# Patient Record
Sex: MALE | Race: WHITE | NOT HISPANIC OR LATINO | URBAN - NONMETROPOLITAN AREA
[De-identification: names, ages, dates, MRNs, and addresses within clinical notes are randomized per-mention and may not be internally consistent; named-entity substitution may affect disease eponyms.]

---

## 2020-02-24 ENCOUNTER — APPOINTMENT (OUTPATIENT)
Dept: URBAN - NONMETROPOLITAN AREA CLINIC 45 | Age: 35
Setting detail: DERMATOLOGY
End: 2020-02-24

## 2020-02-24 ENCOUNTER — RX ONLY (RX ONLY)
Age: 35
End: 2020-02-24

## 2020-02-24 DIAGNOSIS — L40.0 PSORIASIS VULGARIS: ICD-10-CM

## 2020-02-24 PROCEDURE — OTHER TREATMENT REGIMEN: OTHER

## 2020-02-24 PROCEDURE — 99202 OFFICE O/P NEW SF 15 MIN: CPT

## 2020-02-24 PROCEDURE — OTHER COUNSELING: OTHER

## 2020-02-24 PROCEDURE — OTHER PRESCRIPTION: OTHER

## 2020-02-24 PROCEDURE — OTHER MIPS QUALITY: OTHER

## 2020-02-24 RX ORDER — CALCIPOTRIENE 0.05 MG/G
APPLY CREAM TOPICAL QD
Qty: 1 | Refills: 3 | Status: ERX | COMMUNITY
Start: 2020-02-24

## 2020-02-24 RX ORDER — CLOBETASOL PROPIONATE 0.5 MG/G
APPLY CREAM TOPICAL AS DIRECTED
Qty: 1 | Refills: 3 | Status: ERX | COMMUNITY
Start: 2020-02-24

## 2020-02-24 ASSESSMENT — LOCATION ZONE DERM
LOCATION ZONE: SCALP
LOCATION ZONE: HAND
LOCATION ZONE: TRUNK
LOCATION ZONE: FACE

## 2020-02-24 ASSESSMENT — LOCATION DETAILED DESCRIPTION DERM
LOCATION DETAILED: MID-OCCIPITAL SCALP
LOCATION DETAILED: SUPRAPUBIC SKIN
LOCATION DETAILED: RIGHT INFERIOR MEDIAL MALAR CHEEK
LOCATION DETAILED: RIGHT DORSAL RING METACARPOPHALANGEAL JOINT

## 2020-02-24 ASSESSMENT — LOCATION SIMPLE DESCRIPTION DERM
LOCATION SIMPLE: GROIN
LOCATION SIMPLE: RIGHT HAND
LOCATION SIMPLE: POSTERIOR SCALP
LOCATION SIMPLE: RIGHT CHEEK

## 2020-02-24 NOTE — PROCEDURE: TREATMENT REGIMEN
Detail Level: Zone
Continue Regimen: Clobetasol cream apply twice a day to back of hands (2 week cycles)\\nCalcipotriene cream apply twice a day as needed for psoriasis \\nAvoid excessive hand washing\\nIncrease emollients (Cetaphil or Cerave)\\nCheck labs: Heb B surface and core AB, Hep C and TB/Q-gold (Patient request that he goes to PCP to have labs drawn)\\nWill request most recent blood work from PCP \\nWill consider Otzela if labs normal
Action 1: Continue

## 2021-04-06 ENCOUNTER — APPOINTMENT (OUTPATIENT)
Dept: URBAN - NONMETROPOLITAN AREA CLINIC 45 | Age: 36
Setting detail: DERMATOLOGY
End: 2021-04-06

## 2021-04-06 DIAGNOSIS — L40.0 PSORIASIS VULGARIS: ICD-10-CM

## 2021-04-06 PROCEDURE — OTHER COUNSELING: OTHER

## 2021-04-06 PROCEDURE — OTHER TREATMENT REGIMEN: OTHER

## 2021-04-06 PROCEDURE — 99214 OFFICE O/P EST MOD 30 MIN: CPT

## 2021-04-06 PROCEDURE — OTHER PRESCRIPTION: OTHER

## 2021-04-06 RX ORDER — CLOBETASOL PROPIONATE 0.5 MG/G
APPLY CREAM TOPICAL AS DIRECTED
Qty: 1 | Refills: 3 | Status: ERX

## 2021-04-06 ASSESSMENT — LOCATION SIMPLE DESCRIPTION DERM
LOCATION SIMPLE: RIGHT CHEEK
LOCATION SIMPLE: RIGHT HAND
LOCATION SIMPLE: POSTERIOR SCALP
LOCATION SIMPLE: GROIN

## 2021-04-06 ASSESSMENT — LOCATION ZONE DERM
LOCATION ZONE: HAND
LOCATION ZONE: TRUNK
LOCATION ZONE: FACE
LOCATION ZONE: SCALP

## 2021-04-06 ASSESSMENT — LOCATION DETAILED DESCRIPTION DERM
LOCATION DETAILED: MID-OCCIPITAL SCALP
LOCATION DETAILED: RIGHT DORSAL RING METACARPOPHALANGEAL JOINT
LOCATION DETAILED: RIGHT INFERIOR MEDIAL MALAR CHEEK
LOCATION DETAILED: SUPRAPUBIC SKIN

## 2021-04-06 NOTE — PROCEDURE: TREATMENT REGIMEN
Continue Regimen: Clobetasol cream apply twice a day to back of hands (2 week cycles)\\nCalcipotriene cream apply twice a day as needed for psoriasis \\nAvoid excessive hand washing\\nIncrease emollients (Cetaphil or Cerave)\\nCheck labs: CMP,CBC, Q-gold \\nGave Patient a otezla starter pack  ( pt to take the maintenance dose due to he is out of meds ) to get him through until he gets his medication. Will send PA for Otezla.
Action 2: Continue
Detail Level: Zone

## 2021-04-29 ENCOUNTER — RX ONLY (RX ONLY)
Age: 36
End: 2021-04-29

## 2021-04-29 RX ORDER — APREMILAST 30 MG/1
TABLET, FILM COATED ORAL AS DIRECTED
Qty: 60 | Refills: 5 | Status: ERX | COMMUNITY
Start: 2021-04-29

## 2022-04-27 ENCOUNTER — APPOINTMENT (OUTPATIENT)
Dept: URBAN - NONMETROPOLITAN AREA CLINIC 45 | Age: 37
Setting detail: DERMATOLOGY
End: 2022-04-28

## 2022-04-27 DIAGNOSIS — L40.0 PSORIASIS VULGARIS: ICD-10-CM

## 2022-04-27 PROCEDURE — OTHER TREATMENT REGIMEN: OTHER

## 2022-04-27 PROCEDURE — OTHER PRESCRIPTION: OTHER

## 2022-04-27 PROCEDURE — 99214 OFFICE O/P EST MOD 30 MIN: CPT

## 2022-04-27 PROCEDURE — OTHER COUNSELING: OTHER

## 2022-04-27 RX ORDER — CLOBETASOL PROPIONATE 0.5 MG/G
APPLY CREAM TOPICAL BID
Qty: 60 | Refills: 3 | Status: ERX

## 2022-04-27 RX ORDER — APREMILAST 30 MG/1
AS DIRECTED TABLET, FILM COATED ORAL TWICE A DAY
Qty: 60 | Refills: 3 | Status: ERX

## 2022-04-27 ASSESSMENT — LOCATION SIMPLE DESCRIPTION DERM
LOCATION SIMPLE: RIGHT HAND
LOCATION SIMPLE: GROIN
LOCATION SIMPLE: RIGHT CHEEK
LOCATION SIMPLE: POSTERIOR SCALP

## 2022-04-27 ASSESSMENT — LOCATION DETAILED DESCRIPTION DERM
LOCATION DETAILED: MID-OCCIPITAL SCALP
LOCATION DETAILED: RIGHT INFERIOR MEDIAL MALAR CHEEK
LOCATION DETAILED: RIGHT DORSAL RING METACARPOPHALANGEAL JOINT
LOCATION DETAILED: SUPRAPUBIC SKIN

## 2022-04-27 ASSESSMENT — LOCATION ZONE DERM
LOCATION ZONE: SCALP
LOCATION ZONE: TRUNK
LOCATION ZONE: HAND
LOCATION ZONE: FACE

## 2022-04-27 NOTE — PROCEDURE: TREATMENT REGIMEN
Detail Level: Zone
Continue Regimen: Patient is improved with oral otezla, but is not at treatment goal. Will add Clobetasol cream apply twice a day to back of hands (2 week cycles)\\nContinue Calcipotriene cream apply twice a day as needed for psoriasis \\nAvoid excessive hand washing\\nIncrease emollients (Cetaphil or Cerave)\\nCheck labs: CMP, CBC, Q-gold ([facility])\\nOtezla 30 mg take one by mouth twice a day (will send prescription after we receive blood work)\\nConsider future skyrizi if condition becomes uncontrollable in the next 6-12 months
Action 4: Continue